# Patient Record
Sex: MALE | Race: WHITE | NOT HISPANIC OR LATINO | Employment: FULL TIME | URBAN - NONMETROPOLITAN AREA
[De-identification: names, ages, dates, MRNs, and addresses within clinical notes are randomized per-mention and may not be internally consistent; named-entity substitution may affect disease eponyms.]

---

## 2023-11-14 ENCOUNTER — OFFICE VISIT (OUTPATIENT)
Dept: CARDIOLOGY | Facility: CLINIC | Age: 33
End: 2023-11-14
Payer: COMMERCIAL

## 2023-11-14 VITALS
WEIGHT: 275 LBS | DIASTOLIC BLOOD PRESSURE: 70 MMHG | OXYGEN SATURATION: 97 % | HEIGHT: 72 IN | HEART RATE: 66 BPM | SYSTOLIC BLOOD PRESSURE: 110 MMHG | BODY MASS INDEX: 37.25 KG/M2

## 2023-11-14 DIAGNOSIS — G47.10 HYPERSOMNIA, UNSPECIFIED: ICD-10-CM

## 2023-11-14 DIAGNOSIS — G47.33 OSA (OBSTRUCTIVE SLEEP APNEA): Primary | ICD-10-CM

## 2023-11-14 DIAGNOSIS — J34.2 DEVIATED SEPTUM: ICD-10-CM

## 2023-11-14 NOTE — ASSESSMENT & PLAN NOTE
He reports he had a broken nose around 11 years ago.    He reports difficulty breathing through his right nare he says 80% of the time.    We discussed consideration to see an ears nose and throat specialist.     He would like to wait until his next follow-up visit to consider this option as he may be joining his wife's insurance.

## 2023-11-14 NOTE — ASSESSMENT & PLAN NOTE
He reports that he has been using his BiPAP faithfully but over the last few months he has been having an increase in excessive daytime sleepiness, snoring with BiPAP on, mass leak in between his eyes and wife reporting difficult to awaken him in the mornings or awaken him from sleep to take care of his  baby.    Plan:   -Slight adjustment of BiPAP pressures from 20/10 to 20/12.  -DME order for new mask style and mask fitting if needed.  Consider AirFit F30.  -Titration study.  He has had a split study in the past with very severe sleep apnea.  His AHI on download remains mildly elevated at 7.  He has gained about 15 pounds since his last sleep study.  Symptoms have worsened on current BiPAP settings.    Follow-up after pressure adjustment with PAP usage for more than 1 month and follow-up after titration study to review the findings.

## 2023-11-14 NOTE — ASSESSMENT & PLAN NOTE
Baseline .  This is very severe sleep apnea.    He has failed CPAP in the past.  He is currently on BiPAP.  Download is reviewed with mildly elevated AHI of 7 and good compliance.    He is benefiting from BiPAP therapy and we plan to continue BiPAP therapy.    Prescription for BiPAP supplies including a new mask style.  He would like to consider a mask that does not go over the nose as he is having air leak in between his eyes.  Discussed AirFit F30 which is an under the nose over the mouth style.    Prescription for slight pressure adjustment on BiPAP by flex to 20/12 flex of 3.    Follow-up in 2 months on new pressures with new mask to reevaluate AHI and follow-up on symptoms.

## 2023-11-14 NOTE — PROGRESS NOTES
New Sleep Consult     Date:   2023  Name: Karlo Vela  :   1990  PCP: Provider, No Known    Chief Complaint   Patient presents with   • Establish Care     Neck size: 17in   • Sleep Apnea       Subjective     History of Present Illness  Karlo Vela is a 33 y.o. male who presents today for new/old patient evaluation to reestablish care. He was last seen more than 3 years ago. He has been using a BIPAP. He reports that his PAP was in the recall and he now has a replacement .     He reports that when he first started BiPAP that all his symptoms had improved.  But he has starting feeling more EDS, he is hard to awaken that has been a gradual problem over the last few months.     He has new born, 4 months old now and his wife reports that she has a very hard time awakening him to take his turn with the baby.  He reports that he is sleepy in the mornings.  He feels like he needs a nap but he does not take naps.  His schedule does not allow naps.  His wife reports that he is snoring severely in his fullface mask and with BiPAP on.    He is working as a  and he reports it is a physical and mentally stressful job.  He reports he is working 12-hour shifts that is 7 a to 7P.  He goes to bed around 10:30 PM and awakens at 5:30 AM.  He reports that occasionally he will only get 4 hours of sleep.  But this is rare.  He reports normally around 6-7 hours of sleep.  He also reports that if he sleeps 4 hours or he sleeps 9 hours that he feels about the same.              Sleep history:    GABRIELA  on 2020 split study.  Titration portion failed CPAP and titrated to BiPAP 16/12    Current GABRIELA therapy BiPAP Bioflex 20/10 flex 3    Further details are as follows:    Neck Measurement: 17 inches    Ecorse Scale is (out of 24): Total score: 8     Estimated average amount of sleep per night: 6-7 hours  Number of times he wakes up at night: 0  Difficulty falling back asleep: no  It usually takes 10  "minutes to go to sleep.  He feels sleepy upon waking up: yes  Rotating or night shift work: no    Drowsiness/Sleepiness:  He exhibits the following:  excessive daytime sleepiness  excessive daytime fatigue  falls asleep watching TV  falls asleep during times of the day when he is quiet    Snoring/Breathing:  He exhibits the following:  loud snoring, snores in all sleep positions, quits breathing at night, awakens with dry mouth, trouble breathing through nose at night, and trouble breathing through nose during the day    Head Injury:  He exhibits the following:  No    Reflux:  He describes the following:  eats 2 meals daily   exercises 3-5 times per week counting work    Narcolepsy:  He exhibits the following:  none    RLS/PLMs:  He describes the following:  none    Insomnia:  He describes the following:  restless sleep    Parasomnia:  He exhibits the following:  None     Weight:       11/14/23  0903   Weight: 125 kg (275 lb)      Weight change in the last  5 years :  gain: 15 lbs    The patient's relevant past medical, surgical, family, and social history reviewed and updated in Epic as appropriate.    Past Medical History:   Diagnosis Date   • GERD (gastroesophageal reflux disease)    • Sleep apnea      History reviewed. No pertinent surgical history.    No Known Allergies  Prior to Admission medications    Not on File     Family History   Adopted: Yes       Objective     Vital Signs:  /70   Pulse 66   Ht 182.9 cm (72\")   Wt 125 kg (275 lb)   SpO2 97%   BMI 37.30 kg/m²     Class 2 Severe Obesity (BMI >=35 and <=39.9). Obesity-related health conditions include the following: obstructive sleep apnea. Obesity is worsening. BMI is is above average; BMI management plan is completed. We discussed low calorie, low carb based diet program and portion control.        Physical Exam  Constitutional:       Appearance: Normal appearance. He is well-developed.   HENT:      Head: Normocephalic and atraumatic.      " Nose: Nose normal.      Mouth/Throat:      Mouth: Mucous membranes are moist.   Eyes:      General: No scleral icterus.     Pupils: Pupils are equal, round, and reactive to light.   Neck:      Vascular: No carotid bruit.   Cardiovascular:      Rate and Rhythm: Normal rate and regular rhythm.      Pulses: Normal pulses.           Radial pulses are 2+ on the right side and 2+ on the left side.        Dorsalis pedis pulses are 2+ on the right side and 2+ on the left side.        Posterior tibial pulses are 2+ on the right side and 2+ on the left side.      Heart sounds: Normal heart sounds. No murmur heard.  Pulmonary:      Effort: Pulmonary effort is normal.      Breath sounds: Normal breath sounds. No wheezing or rhonchi.   Abdominal:      General: Bowel sounds are normal.   Musculoskeletal:      Right lower leg: No edema.      Left lower leg: No edema.   Skin:     General: Skin is warm and dry.      Capillary Refill: Capillary refill takes less than 2 seconds.      Coloration: Skin is not cyanotic.      Nails: There is no clubbing.   Neurological:      Mental Status: He is alert and oriented to person, place, and time.      Motor: No weakness.      Gait: Gait normal.   Psychiatric:         Mood and Affect: Mood normal.         Behavior: Behavior normal. Behavior is cooperative.         Thought Content: Thought content normal.         Cognition and Memory: Memory normal.         The following data was reviewed by: WYATT Jain on 11/14/2023:    PAP download reviewed: 10/14/2023 through 11/12/2023.  30-day download.  I have reviewed and interpreted the data on the download.  and split study 9/26/2020 and office note from [facility] dated 10/7/2020.          Assessment and Plan     Karlo Vela is a 33 y.o. male who presents for further evaluation of excessive daytime sleepiness and fatigue, nonrestorative sleep, and concerns for symptoms on BiPAP with known history of  obstructive sleep apnea.  We will obtain full night titration testing for further evaluation.  The patient will return for follow-up and recommendations after test.  I have discussed weight loss as it pertains to obstructive sleep apnea.    Diagnoses and all orders for this visit:    1. GABRIELA (obstructive sleep apnea) (Primary)  Assessment & Plan:  Baseline .  This is very severe sleep apnea.    He has failed CPAP in the past.  He is currently on BiPAP.  Download is reviewed with mildly elevated AHI of 7 and good compliance.    He is benefiting from BiPAP therapy and we plan to continue BiPAP therapy.    Prescription for BiPAP supplies including a new mask style.  He would like to consider a mask that does not go over the nose as he is having air leak in between his eyes.  Discussed AirFit F30 which is an under the nose over the mouth style.    Prescription for slight pressure adjustment on BiPAP by flex to 20/12 flex of 3.    Follow-up in 2 months on new pressures with new mask to reevaluate AHI and follow-up on symptoms.    Orders:  -     PAP Therapy  -     Titration; Future    2. Hypersomnia, unspecified  Assessment & Plan:  He reports that he has been using his BiPAP faithfully but over the last few months he has been having an increase in excessive daytime sleepiness, snoring with BiPAP on, mass leak in between his eyes and wife reporting difficult to awaken him in the mornings or awaken him from sleep to take care of his  baby.    Plan:   -Slight adjustment of BiPAP pressures from 20/10 to 20/12.  -DME order for new mask style and mask fitting if needed.  Consider AirFit F30.  -Titration study.  He has had a split study in the past with very severe sleep apnea.  His AHI on download remains mildly elevated at 7.  He has gained about 15 pounds since his last sleep study.  Symptoms have worsened on current BiPAP settings.    Follow-up after pressure adjustment with PAP usage for more than 1 month and  follow-up after titration study to review the findings.      Orders:  -     Titration; Future    3. Deviated septum  Assessment & Plan:  He reports he had a broken nose around 11 years ago.    He reports difficulty breathing through his right nare he says 80% of the time.    We discussed consideration to see an ears nose and throat specialist.     He would like to wait until his next follow-up visit to consider this option as he may be joining his wife's insurance.          I discussed the consequences of uncontrolled sleep apnea including hypertension, heart disease, diabetes, stroke, and dementia. I further discussed sleep apnea therapeutic options including CPAP, Weight loss,and ENT evaluation with possible surgery.    Report if any new/changing symptoms immediately, Sleep risks reviewed (driving, medical, sleep death, sedating agents), Sleep hygiene discussed, and Increase pap therapy usage         Follow Up  Return in about 2 months (around 1/14/2024) for GABRIELA/ pressure adjustment/titration study .  Patient was given instructions and counseling regarding his condition or for health maintenance advice. Please see specific information pulled into the AVS if appropriate.

## 2024-01-08 ENCOUNTER — TELEPHONE (OUTPATIENT)
Dept: CARDIOLOGY | Facility: CLINIC | Age: 34
End: 2024-01-08
Payer: COMMERCIAL

## 2024-01-08 NOTE — TELEPHONE ENCOUNTER
Patient called and said he is having trouble with his SS and Supplies.he would like a call back to go over some questions he has

## 2024-01-08 NOTE — TELEPHONE ENCOUNTER
Spoke with patient. Updated insurance info. Patient stated that when he went for titration, they were going to charge him over $2000, so patient left. Patient is not enrolled in his wife's insurance plan. Patient is wanting titration to be reauthed through his insurance as primary and his wife's as secondary.   Intrusted patient to call [employer] regarding supplies being resent to correct address.

## 2024-01-08 NOTE — TELEPHONE ENCOUNTER
HAVE SUBMITTED [employer] FOR THE [employer] INSURANCE AND NO AUTH REQUIRED. REFERRAL HAS BEEN UPDATED AND FAXED TO [facility] TO BE SCHEDULED.

## 2024-01-17 ENCOUNTER — OFFICE VISIT (OUTPATIENT)
Dept: CARDIOLOGY | Facility: CLINIC | Age: 34
End: 2024-01-17
Payer: COMMERCIAL

## 2024-01-17 VITALS
DIASTOLIC BLOOD PRESSURE: 80 MMHG | OXYGEN SATURATION: 96 % | WEIGHT: 276 LBS | HEART RATE: 88 BPM | HEIGHT: 72 IN | BODY MASS INDEX: 37.38 KG/M2 | SYSTOLIC BLOOD PRESSURE: 118 MMHG

## 2024-01-17 DIAGNOSIS — G47.33 OSA (OBSTRUCTIVE SLEEP APNEA): Primary | ICD-10-CM

## 2024-01-17 DIAGNOSIS — J34.2 DEVIATED SEPTUM: ICD-10-CM

## 2024-01-17 DIAGNOSIS — G47.10 HYPERSOMNIA, UNSPECIFIED: ICD-10-CM

## 2024-01-17 NOTE — ASSESSMENT & PLAN NOTE
History of broken nose 11 years ago.    He has difficulty breathing through his right nare.    At his last visit we wanted to consider referral to ears nose and throat but he wanted to wait till this visit.  At today's visit he again would like to defer that referral.

## 2024-01-17 NOTE — ASSESSMENT & PLAN NOTE
He reports that he did get all new supplies for his BiPAP.  And this has decreased his sleep interruption.    He reports he still having some excessive daytime sleepiness.  But symptom is improved.    He did not get his pressure adjustment as the DME company reports they did not receive the fax.    He did not complete an in lab titration study as he reports that his part of the study was $2000 and he could not financially afford this at this time.    Plan:  -Order is resent to DME for pressure adjustment for BiPAP 20/12  -Short follow-up in about 6 weeks to reassess airflow comfort after pressure adjustment and reassess symptom of daytime sleepiness on new pressure.

## 2024-01-17 NOTE — ASSESSMENT & PLAN NOTE
Baseline .  This is very severe sleep apnea.    He is on BiPAP therapy.  Download reviewed with AHI only mildly elevated at 6.  But overall very good control as his baseline was 104.  He has very good compliance.    He is benefiting from PAP therapy and we plan to continue PAP therapy.    Plan:  -Resend the order for a pressure adjustment  -I gave the patient a copy of the order for pressure adjustment and he is to monitor his pressure.  Device comes on at 10 cm and he is encouraged to follow this for 3 to 5 days if not adjusted to 12 cm in 1 week then he should contact the DME with his copy of the order to get pressures adjusted.  He verbalized understanding.  -Short follow-up in 6 weeks to reassess airflow comfort and symptoms with pressure adjustment.

## 2024-01-17 NOTE — PROGRESS NOTES
Follow-Up Sleep Consult     Date:   2024  Name: Karlo Vela  :   1990  PCP: Provider, No Known    Chief Complaint   Patient presents with    Sleep Apnea       Subjective     History of Present Illness  Karlo Vela is a 33 y.o. male who presents today for follow-up on GABRIELA.  He has a known history of very severe sleep apnea on BiPAP therapy.    At his last visit it was noted that his PAP supplies was very old.  He was having excessive daytime sleepiness and reported this was due to interruptions as his mask was not fitting well.  He was ordered a titration study and pressure adjustment on his PAP device.    At today's visit he follows up for the above.  Noted that his pressure did not get adjusted.  He reports he did not complete titration study as his portion of the study was $2000 and he could not afford.    He reports that he did get new CPAP supplies and this is a huge improvement.  He reports he is not having sleep interruptions due to mask leaking.  He denies any sleepiness when driving a car.    Sleep history:    GABRIELA  on 2020 split study.  Titration portion failed CPAP and titrated to BiPAP 16/12    Current GABRIELA therapy BiPAP Bioflex 20/10 flex 3        Device Functioning Well: Yes  Mask Fit Comfortable: Yes  Air Flow Comfortable: Yes  DME Helpful for Supplies: Yes -but he did not have pressures adjusted.  Contacted DME representative for help with having pressures adjusted.  Sleep is rested: Yes        Device Download:                 The patient's relevant past medical, surgical, family, and social history reviewed and updated in Epic as appropriate.    Past Medical History:   Diagnosis Date    GERD (gastroesophageal reflux disease)     Sleep apnea      History reviewed. No pertinent surgical history.    No Known Allergies  Prior to Admission medications    Not on File     Family History   Adopted: Yes       Objective     Vital Signs:  /80   Pulse 88   Ht 182.9 cm  "(72\")   Wt 125 kg (276 lb)   SpO2 96%   BMI 37.43 kg/m²              Physical Exam  HENT:      Head: Normocephalic.      Nose: Nose normal.      Mouth/Throat:      Mouth: Mucous membranes are moist.   Pulmonary:      Effort: Pulmonary effort is normal.   Skin:     General: Skin is warm and dry.   Neurological:      Mental Status: He is alert and oriented to person, place, and time.   Psychiatric:         Mood and Affect: Mood normal.         Behavior: Behavior normal.         Thought Content: Thought content normal.         The following data was reviewed by: WYATT Jain on 01/17/2024:    PAP download reviewed: 12/17/2023 through January 15, 2024.  30-day download.  I have reviewed and interpreted the data on the download.         Assessment and Plan     Diagnoses and all orders for this visit:    1. GABRIELA (obstructive sleep apnea) (Primary)  Assessment & Plan:  Baseline .  This is very severe sleep apnea.    He is on BiPAP therapy.  Download reviewed with AHI only mildly elevated at 6.  But overall very good control as his baseline was 104.  He has very good compliance.    He is benefiting from PAP therapy and we plan to continue PAP therapy.    Plan:  -Resend the order for a pressure adjustment  -I gave the patient a copy of the order for pressure adjustment and he is to monitor his pressure.  Device comes on at 10 cm and he is encouraged to follow this for 3 to 5 days if not adjusted to 12 cm in 1 week then he should contact the DME with his copy of the order to get pressures adjusted.  He verbalized understanding.  -Short follow-up in 6 weeks to reassess airflow comfort and symptoms with pressure adjustment.      2. Hypersomnia, unspecified  Assessment & Plan:  He reports that he did get all new supplies for his BiPAP.  And this has decreased his sleep interruption.    He reports he still having some excessive daytime sleepiness.  But symptom is improved.    He did not get his pressure " adjustment as the DME company reports they did not receive the fax.    He did not complete an in lab titration study as he reports that his part of the study was $2000 and he could not financially afford this at this time.    Plan:  -Order is resent to DME for pressure adjustment for BiPAP 20/12  -Short follow-up in about 6 weeks to reassess airflow comfort after pressure adjustment and reassess symptom of daytime sleepiness on new pressure.      3. Deviated septum  Assessment & Plan:  History of broken nose 11 years ago.    He has difficulty breathing through his right nare.    At his last visit we wanted to consider referral to ears nose and throat but he wanted to wait till this visit.  At today's visit he again would like to defer that referral.          Report if any new/changing symptoms immediately, Increase pap therapy usage, and increase hours of sleep to 7 hours per night.         Follow Up  Return in about 6 weeks (around 2/28/2024) for GABRIELA/pressure adjustment , .  Patient was given instructions and counseling regarding his condition or for health maintenance advice. Please see specific information pulled into the AVS if appropriate.

## 2024-02-28 ENCOUNTER — OFFICE VISIT (OUTPATIENT)
Dept: CARDIOLOGY | Facility: CLINIC | Age: 34
End: 2024-02-28
Payer: COMMERCIAL

## 2024-02-28 VITALS
SYSTOLIC BLOOD PRESSURE: 114 MMHG | OXYGEN SATURATION: 96 % | BODY MASS INDEX: 35.76 KG/M2 | DIASTOLIC BLOOD PRESSURE: 80 MMHG | WEIGHT: 264 LBS | HEIGHT: 72 IN | HEART RATE: 67 BPM

## 2024-02-28 DIAGNOSIS — G47.33 OSA (OBSTRUCTIVE SLEEP APNEA): Primary | ICD-10-CM

## 2024-02-28 DIAGNOSIS — G47.10 HYPERSOMNIA, UNSPECIFIED: ICD-10-CM

## 2024-02-28 PROCEDURE — 99213 OFFICE O/P EST LOW 20 MIN: CPT | Performed by: NURSE PRACTITIONER

## 2024-02-28 NOTE — ASSESSMENT & PLAN NOTE
Baseline AHI is 104.  This is severe sleep apnea.    He is on BiPAP therapy.  Download reviewed with good control and good compliance.    He is benefiting from PAP therapy with plan to continue PAP therapy.    Plan:    Follow-up in 9 months to review symptoms and download.

## 2024-02-28 NOTE — PROGRESS NOTES
"    Follow-Up Sleep Consult     Date:   2024  Name: Karlo Vela  :   1990  PCP: Provider, No Known    Chief Complaint   Patient presents with    Sleep Apnea       Subjective     History of Present Illness  Karlo Vela is a 33 y.o. male who presents today for follow-up on GABRIELA.  Short follow-up on pressure adjustments.  He reports that he did receive new CPAP supplies from his DME.  He reports that his new pressure adjustments airflow is very comfortable.    He reports improved excessive daytime sleepiness.    Sleep history:    GABRIELA  on 2020 split study.  Titration portion failed CPAP and titrated to BiPAP 16/12    Current GABRIELA therapy BiPAP Bioflex 20/10 flex 3    Current mask used is fullface mask    Device Functioning Well: Yes  Mask Fit Comfortable: Yes  Air Flow Comfortable: Yes  DME Helpful for Supplies: Yes  Sleep is rested: Yes-but some sleep interruption from 8-month-old infant.  He reports he is taking shift turns with his wife taking care of the baby during the night.  When his sleep is interrupted he notices more daytime sleepiness.        Device Download:                 The patient's relevant past medical, surgical, family, and social history reviewed and updated in Epic as appropriate.    Past Medical History:   Diagnosis Date    GERD (gastroesophageal reflux disease)     Sleep apnea      History reviewed. No pertinent surgical history.    No Known Allergies  Prior to Admission medications    Not on File     Family History   Adopted: Yes       Objective     Vital Signs:  /80   Pulse 67   Ht 182.9 cm (72\")   Wt 120 kg (264 lb)   SpO2 96%   BMI 35.80 kg/m²              Physical Exam  HENT:      Head: Normocephalic.      Nose: Nose normal.      Mouth/Throat:      Mouth: Mucous membranes are moist.   Pulmonary:      Effort: Pulmonary effort is normal.   Skin:     General: Skin is warm and dry.   Neurological:      Mental Status: He is alert and oriented to person, " place, and time.   Psychiatric:         Mood and Affect: Mood normal.         Behavior: Behavior normal.         Thought Content: Thought content normal.         The following data was reviewed by: WYATT Jain on 02/28/2024:    PAP download reviewed: 1/28/2024 through 2/26/2024.  30-day download.  I have reviewed and interpreted the data on the download.         Assessment and Plan     Diagnoses and all orders for this visit:    1. GABRIELA (obstructive sleep apnea) (Primary)  Assessment & Plan:  Baseline AHI is 104.  This is severe sleep apnea.    He is on BiPAP therapy.  Download reviewed with good control and good compliance.    He is benefiting from PAP therapy with plan to continue PAP therapy.    Plan:    Follow-up in 9 months to review symptoms and download.      2. Hypersomnia, unspecified  Assessment & Plan:  He reports that he does have some excessive daytime sleepiness but his symptoms are greatly improved.    He reports that he has an 8-month-old baby at home that does have some irregular sleep.  He has noticed when he has sleep interruption then he will have increased excessive daytime sleepiness.    Overall he reports that he is feeling much better since getting new PAP supplies and having his BiPAP pressure increased.    Again he is advised if his symptoms increase or worsen then to return to the clinic for further evaluation.    He is advised to follow safety precautions and if he is sleepy when driving then no driving.  Patient verbalized understanding.          Report if any new/changing symptoms immediately and Sleep risks reviewed (driving, medical, sleep death, sedating agents)         Follow Up  Return in about 9 months (around 11/28/2024) for GABRIELA.  Patient was given instructions and counseling regarding his condition or for health maintenance advice. Please see specific information pulled into the AVS if appropriate.

## 2024-02-28 NOTE — ASSESSMENT & PLAN NOTE
He reports that he does have some excessive daytime sleepiness but his symptoms are greatly improved.    He reports that he has an 8-month-old baby at home that does have some irregular sleep.  He has noticed when he has sleep interruption then he will have increased excessive daytime sleepiness.    Overall he reports that he is feeling much better since getting new PAP supplies and having his BiPAP pressure increased.    Again he is advised if his symptoms increase or worsen then to return to the clinic for further evaluation.    He is advised to follow safety precautions and if he is sleepy when driving then no driving.  Patient verbalized understanding.

## 2024-10-23 ENCOUNTER — TELEMEDICINE (OUTPATIENT)
Dept: FAMILY MEDICINE CLINIC | Facility: TELEHEALTH | Age: 34
End: 2024-10-23
Payer: COMMERCIAL

## 2024-10-23 DIAGNOSIS — H66.001 ACUTE SUPPURATIVE OTITIS MEDIA OF RIGHT EAR WITHOUT SPONTANEOUS RUPTURE OF TYMPANIC MEMBRANE, RECURRENCE NOT SPECIFIED: Primary | ICD-10-CM

## 2024-10-23 RX ORDER — SUMATRIPTAN 100 MG/1
TABLET, FILM COATED ORAL
COMMUNITY
Start: 2024-08-29

## 2024-10-23 RX ORDER — CEFDINIR 300 MG/1
300 CAPSULE ORAL 2 TIMES DAILY
Qty: 20 CAPSULE | Refills: 0 | Status: SHIPPED | OUTPATIENT
Start: 2024-10-23 | End: 2024-11-02

## 2024-10-23 RX ORDER — PREDNISONE 10 MG/1
TABLET ORAL
Qty: 21 TABLET | Refills: 0 | Status: SHIPPED | OUTPATIENT
Start: 2024-10-23

## 2024-10-23 RX ORDER — HYDROCODONE BITARTRATE AND ACETAMINOPHEN 5; 325 MG/1; MG/1
1 TABLET ORAL EVERY 6 HOURS
COMMUNITY
Start: 2024-08-20

## 2024-10-23 NOTE — PROGRESS NOTES
You have chosen to receive care through a telehealth visit.  Do you consent to use a video/audio connection for your medical care today? Yes     CHIEF COMPLAINT  No chief complaint on file.        HPI  Karlo Vela is a 34 y.o. male  presents with complaint of 2 day history of congestion, swollen tender lymph nodes, hearing loss in right ear and tinnitus, fever 101, right ear has pain which is radiating into right jaw, occasional cough with PND.  Denies wheezing, shortness of breath.     Review of Systems  See HPI    Past Medical History:   Diagnosis Date    GERD (gastroesophageal reflux disease)     Sleep apnea        Family History   Adopted: Yes       Social History     Socioeconomic History    Marital status:    Tobacco Use    Smoking status: Former     Current packs/day: 0.00     Types: Cigarettes     Quit date: 2018     Years since quittin.8     Passive exposure: Past    Smokeless tobacco: Never   Vaping Use    Vaping status: Never Used   Substance and Sexual Activity    Alcohol use: Never    Drug use: Never    Sexual activity: Defer       Karlo Vela  reports that he quit smoking about 6 years ago. His smoking use included cigarettes. He has been exposed to tobacco smoke. He has never used smokeless tobacco.               There were no vitals taken for this visit.    PHYSICAL EXAM  Physical Exam   Constitutional: He is oriented to person, place, and time. He appears well-developed and well-nourished. He does not have a sickly appearance. He does not appear ill.   HENT:   Head: Normocephalic and atraumatic.   Right Ear: External ear normal. There is tenderness. No drainage. Decreased hearing is noted.   Pulmonary/Chest: Effort normal.  No respiratory distress.  Lymphadenopathy:        Right cervical: Anterior cervical adenopathy present.        Left cervical: Anterior cervical adenopathy present.   Neurological: He is alert and oriented to person, place, and time.       No results found for  this or any previous visit.    Diagnoses and all orders for this visit:    1. Acute suppurative otitis media of right ear without spontaneous rupture of tympanic membrane, recurrence not specified (Primary)  -     cefdinir (OMNICEF) 300 MG capsule; Take 1 capsule by mouth 2 (Two) Times a Day for 10 days.  Dispense: 20 capsule; Refill: 0  -     predniSONE (DELTASONE) 10 MG (21) dose pack; Use as directed on package  Dispense: 21 tablet; Refill: 0    --take medications as prescribed  --increase fluids, rest as needed, tylenol or ibuprofen for pain  --f/u in 5-7 days if no improvement        FOLLOW-UP  As discussed during visit with PCP/[facility] if no improvement or Urgent Care/Emergency Department if worsening of symptoms    Patient verbalizes understanding of medication dosage, comfort measures, instructions for treatment and follow-up.    Marta Perea, APRN  10/23/2024  14:16 EDT    Mode of Visit: Video  Location of patient: Home  Location of provider: Home  You have chosen to receive care through a telehealth visit.  The patient has signed the video visit consent form.  The visit included audio and video interaction. No technical issues occurred during this visit.      Note Disclaimer: At [facility], we believe that sharing information builds trust and better   relationships. You are receiving this note because you recently visited [facility]. It is possible you   will see health information before a provider has talked with you about it. This kind of information can   be easy to misunderstand. To help you fully understand what it means for your health, we urge you to   discuss this note with your provider.

## 2024-11-20 ENCOUNTER — PATIENT ROUNDING (BHMG ONLY) (OUTPATIENT)
Dept: CARDIOLOGY | Facility: CLINIC | Age: 34
End: 2024-11-20
Payer: COMMERCIAL

## 2024-11-20 ENCOUNTER — OFFICE VISIT (OUTPATIENT)
Dept: CARDIOLOGY | Facility: CLINIC | Age: 34
End: 2024-11-20
Payer: COMMERCIAL

## 2024-11-20 VITALS
OXYGEN SATURATION: 97 % | HEIGHT: 72 IN | BODY MASS INDEX: 37.25 KG/M2 | HEART RATE: 67 BPM | WEIGHT: 275 LBS | SYSTOLIC BLOOD PRESSURE: 116 MMHG | DIASTOLIC BLOOD PRESSURE: 66 MMHG

## 2024-11-20 DIAGNOSIS — G47.10 HYPERSOMNIA, UNSPECIFIED: ICD-10-CM

## 2024-11-20 DIAGNOSIS — G47.33 OSA (OBSTRUCTIVE SLEEP APNEA): Primary | ICD-10-CM

## 2024-11-20 PROCEDURE — 99213 OFFICE O/P EST LOW 20 MIN: CPT | Performed by: NURSE PRACTITIONER

## 2024-11-20 RX ORDER — TOPIRAMATE 25 MG/1
TABLET, FILM COATED ORAL
COMMUNITY
Start: 2024-11-06

## 2024-11-20 NOTE — PROGRESS NOTES
..My name is Annmarie Henson and I am the Practice Manager for [facility] [place].    I would like to thank you for being a loyal patient. If you do not mind I would like to ask you a few questions about your recent visit with us.  Please feel free to reply if you wish to provide us with feedback on your first visit with our practice.    First, could you tell me what went well with your recent visit?    Secondly, we are always looking for ways to make our patients' experiences even better.  Do you have any recommendations on ways we may improve?    Finally, overall were you satisfied with your first visit to us as a [facility] [place] facility?    In the next few days, you will be receiving a Patient Experience Survey.      Thank you for taking the time to answer a few questions today.  I hope you have a good day.

## 2024-11-20 NOTE — ASSESSMENT & PLAN NOTE
Baseline AHI is 104.  This is severe sleep apnea.    He is on BiPAP therapy.  Download reviewed with good control and good compliance.    Benefiting from PAP therapy.    Plan to continue PAP therapy.    He reports that he has some excessive daytime sleepiness and his airflow feels like he could use more airflow    Prescription to DME of patient's choice for slight pressure adjustment to include BiPAP 20/14 and BiPAP supplies    Although up in 3 months to review a download for pressure adjustment airflow comfort AHI and compliance

## 2024-11-20 NOTE — ASSESSMENT & PLAN NOTE
Reports some excessive daytime sleepiness but symptoms are greatly improved on BiPAP.    Reports symptoms may be related to having a toddler at home with irregular sleep and sleep interruptions.    He reports that the last time we adjusted his BiPAP that this improved his symptoms and he feels like now that he needs another airflow adjustment.    Plan:    Slight pressure adjustment on the BiPAP    Follow-up in 3 months to reassess symptoms

## 2024-11-20 NOTE — PROGRESS NOTES
Follow-Up Sleep Consult     Date:   2024  Name: Karlo Vela  :   1990  PCP: Noa Martel PA-C    Chief Complaint   Patient presents with    Sleep Apnea       Subjective     History of Present Illness  Karlo Vela is a 34 y.o. male who presents today for follow-up on GABRIELA.  And month follow-up visit on his known history of very severe sleep apnea on BiPAP therapy.    He reports that he is a faithful user of BiPAP.  He reports that helped his symptoms a lot.    He reports that the airflow is comfortable but he wonders if he could have an increase in pressure again.    Sleep history:    GABRIELA  on 2020 split study.  Titration portion failed CPAP and titrated to BiPAP 16    Current GABRIELA therapy BiPAP Bioflex  flex 3      Current mask used is FFM    Device Functioning Well: Yes  Mask Fit Comfortable: Yes  Air Flow Comfortable: Yes - but feels like he could use more air flow.   DME Helpful for Supplies: Yes  Sleep is rested: Yes, but he does have some daytime sleepiness at times.  He denies any sleepiness when driving or when working.    Device Download:                 The patient's relevant past medical, surgical, family, and social history reviewed and updated in Epic as appropriate.    Past Medical History:   Diagnosis Date    GERD (gastroesophageal reflux disease)     Sleep apnea      History reviewed. No pertinent surgical history.    No Known Allergies  Prior to Admission medications    Medication Sig Start Date End Date Taking? Authorizing Provider   SUMAtriptan (IMITREX) 100 MG tablet TAKE 1 TABLET BY MOUTH AT ONSET OF HEADACHE. MAY REPEAT DOSE AFTER 2 HOURS IF SYMPTOMS PERSIST. 24  Yes ProviderChina MD   topiramate (TOPAMAX) 25 MG tablet TAKE 1 TABLET BY MOUTH ONCE DAILY FOR 7 DAYS THEN INCREASE TO 2 TABLETS ONCE DAILY 24  Yes ProviderChina MD   HYDROcodone-acetaminophen (NORCO) 5-325 MG per tablet Take 1 tablet by mouth Every 6 (Six) Hours.  "8/20/24 11/20/24  Provider, MD [place]   predniSONE (DELTASONE) 10 MG (21) dose pack Use as directed on package 10/23/24 11/20/24  Marta Perea APRN     Family History   Adopted: Yes       Objective     Vital Signs:  /66 (BP Location: Right arm, Patient Position: Sitting, Cuff Size: Large Adult)   Pulse 67   Ht 182.9 cm (72\")   Wt 125 kg (275 lb)   SpO2 97%   BMI 37.30 kg/m²     Class 2 Severe Obesity (BMI >=35 and <=39.9). Obesity-related health conditions include the following: obstructive sleep apnea. Obesity is worsening. BMI is is above average; BMI management plan is completed. We discussed low calorie, low carb based diet program and portion control.        Physical Exam  HENT:      Head: Normocephalic.      Nose: Nose normal.      Mouth/Throat:      Mouth: Mucous membranes are moist.   Pulmonary:      Effort: Pulmonary effort is normal.   Skin:     General: Skin is warm and dry.   Neurological:      Mental Status: He is alert and oriented to person, place, and time.   Psychiatric:         Mood and Affect: Mood normal.         Behavior: Behavior normal.         Thought Content: Thought content normal.         The following data was reviewed by: WYATT Jain on 11/20/2024:    PAP download reviewed: 30-day download as above.  I have reviewed and interpreted the data on the download at today's visit         Assessment and Plan     Diagnoses and all orders for this visit:    1. GABRIELA (obstructive sleep apnea) (Primary)  Assessment & Plan:    Baseline AHI is 104.  This is severe sleep apnea.    He is on BiPAP therapy.  Download reviewed with good control and good compliance.    Benefiting from PAP therapy.    Plan to continue PAP therapy.    He reports that he has some excessive daytime sleepiness and his airflow feels like he could use more airflow    Prescription to DME of patient's choice for slight pressure adjustment to include BiPAP 20/14 and BiPAP supplies    Although up in 3 " months to review a download for pressure adjustment airflow comfort AHI and compliance    Orders:  -     PAP Therapy    2. Hypersomnia, unspecified  Assessment & Plan:  Reports some excessive daytime sleepiness but symptoms are greatly improved on BiPAP.    Reports symptoms may be related to having a toddler at home with irregular sleep and sleep interruptions.    He reports that the last time we adjusted his BiPAP that this improved his symptoms and he feels like now that he needs another airflow adjustment.    Plan:    Slight pressure adjustment on the BiPAP    Follow-up in 3 months to reassess symptoms          Report if any new/changing symptoms immediately and Sleep risks reviewed (driving, medical, sleep death, sedating agents)         Follow Up  Return in about 3 months (around 2/20/2025) for GABRIELA/presure adjustment .  Patient was given instructions and counseling regarding his condition or for health maintenance advice. Please see specific information pulled into the AVS if appropriate.